# Patient Record
Sex: FEMALE | Race: WHITE | HISPANIC OR LATINO | Employment: PART TIME | URBAN - METROPOLITAN AREA
[De-identification: names, ages, dates, MRNs, and addresses within clinical notes are randomized per-mention and may not be internally consistent; named-entity substitution may affect disease eponyms.]

---

## 2025-03-28 ENCOUNTER — TELEPHONE (OUTPATIENT)
Dept: PRIMARY CARE | Facility: CLINIC | Age: 30
End: 2025-03-28

## 2025-03-28 ENCOUNTER — OFFICE VISIT (OUTPATIENT)
Dept: PRIMARY CARE | Facility: CLINIC | Age: 30
End: 2025-03-28
Payer: COMMERCIAL

## 2025-03-28 VITALS
DIASTOLIC BLOOD PRESSURE: 60 MMHG | OXYGEN SATURATION: 98 % | HEIGHT: 61 IN | BODY MASS INDEX: 21.52 KG/M2 | HEART RATE: 78 BPM | SYSTOLIC BLOOD PRESSURE: 106 MMHG | WEIGHT: 114 LBS

## 2025-03-28 DIAGNOSIS — N64.4 BREAST PAIN, LEFT: Primary | ICD-10-CM

## 2025-03-28 PROBLEM — L02.419 ABSCESS OF ARM: Status: RESOLVED | Noted: 2023-03-21 | Resolved: 2025-03-28

## 2025-03-28 PROBLEM — F41.1 GENERALIZED ANXIETY DISORDER: Status: RESOLVED | Noted: 2021-05-31 | Resolved: 2025-03-28

## 2025-03-28 PROBLEM — R05.9 COUGH: Status: RESOLVED | Noted: 2018-12-17 | Resolved: 2025-03-28

## 2025-03-28 PROBLEM — D72.829 ELEVATED WHITE BLOOD CELL COUNT: Status: RESOLVED | Noted: 2018-12-21 | Resolved: 2025-03-28

## 2025-03-28 PROBLEM — R42 DIZZINESS: Status: RESOLVED | Noted: 2018-12-06 | Resolved: 2025-03-28

## 2025-03-28 PROBLEM — R42 VERTIGO: Status: RESOLVED | Noted: 2025-03-28 | Resolved: 2025-03-28

## 2025-03-28 PROBLEM — H65.199 ACUTE NONSUPPURATIVE OTITIS MEDIA: Status: RESOLVED | Noted: 2020-02-19 | Resolved: 2025-03-28

## 2025-03-28 PROBLEM — J45.909 INTRINSIC ASTHMA: Status: ACTIVE | Noted: 2025-03-28

## 2025-03-28 PROBLEM — G43.109 MIGRAINE WITH AURA AND WITHOUT STATUS MIGRAINOSUS, NOT INTRACTABLE: Status: RESOLVED | Noted: 2025-03-28 | Resolved: 2025-03-28

## 2025-03-28 PROBLEM — J06.9 ACUTE UPPER RESPIRATORY INFECTION: Status: RESOLVED | Noted: 2021-10-13 | Resolved: 2025-03-28

## 2025-03-28 PROBLEM — F41.9 ANXIETY: Status: ACTIVE | Noted: 2018-12-06

## 2025-03-28 PROBLEM — F32.A DEPRESSION: Status: ACTIVE | Noted: 2025-03-28

## 2025-03-28 PROBLEM — J03.90 ACUTE TONSILLITIS: Status: RESOLVED | Noted: 2020-02-19 | Resolved: 2025-03-28

## 2025-03-28 PROBLEM — J04.0 LARYNGITIS: Status: RESOLVED | Noted: 2018-12-18 | Resolved: 2025-03-28

## 2025-03-28 PROBLEM — M79.10 MYALGIA: Status: RESOLVED | Noted: 2020-01-02 | Resolved: 2025-03-28

## 2025-03-28 PROBLEM — R21 RASH OF HANDS: Status: RESOLVED | Noted: 2019-03-24 | Resolved: 2025-03-28

## 2025-03-28 PROBLEM — H92.01 OTALGIA OF RIGHT EAR: Status: RESOLVED | Noted: 2021-05-10 | Resolved: 2025-03-28

## 2025-03-28 PROBLEM — M94.0 COSTOCHONDRITIS: Status: RESOLVED | Noted: 2019-10-22 | Resolved: 2025-03-28

## 2025-03-28 PROBLEM — L73.1 INGROWN HAIR: Status: RESOLVED | Noted: 2019-08-29 | Resolved: 2025-03-28

## 2025-03-28 PROBLEM — R59.9 GLANDS SWOLLEN: Status: RESOLVED | Noted: 2019-03-10 | Resolved: 2025-03-28

## 2025-03-28 PROCEDURE — 4004F PT TOBACCO SCREEN RCVD TLK: CPT

## 2025-03-28 PROCEDURE — 99203 OFFICE O/P NEW LOW 30 MIN: CPT

## 2025-03-28 PROCEDURE — 3008F BODY MASS INDEX DOCD: CPT

## 2025-03-28 ASSESSMENT — COLUMBIA-SUICIDE SEVERITY RATING SCALE - C-SSRS: 1. IN THE PAST MONTH, HAVE YOU WISHED YOU WERE DEAD OR WISHED YOU COULD GO TO SLEEP AND NOT WAKE UP?: NO

## 2025-03-28 ASSESSMENT — LIFESTYLE VARIABLES
AUDIT-C TOTAL SCORE: 0
HOW OFTEN DO YOU HAVE A DRINK CONTAINING ALCOHOL: NEVER
HOW OFTEN DO YOU HAVE SIX OR MORE DRINKS ON ONE OCCASION: NEVER
HOW MANY STANDARD DRINKS CONTAINING ALCOHOL DO YOU HAVE ON A TYPICAL DAY: PATIENT DOES NOT DRINK
SKIP TO QUESTIONS 9-10: 1

## 2025-03-28 ASSESSMENT — PATIENT HEALTH QUESTIONNAIRE - PHQ9
2. FEELING DOWN, DEPRESSED OR HOPELESS: NOT AT ALL
SUM OF ALL RESPONSES TO PHQ9 QUESTIONS 1 AND 2: 0
1. LITTLE INTEREST OR PLEASURE IN DOING THINGS: NOT AT ALL

## 2025-03-28 ASSESSMENT — ENCOUNTER SYMPTOMS
WEAKNESS: 0
NECK STIFFNESS: 1
CHILLS: 0
NECK PAIN: 0
MYALGIAS: 1
NUMBNESS: 0
EYES NEGATIVE: 1
WOUND: 0
FEVER: 0
SHORTNESS OF BREATH: 0

## 2025-03-28 ASSESSMENT — PAIN SCALES - GENERAL: PAINLEVEL_OUTOF10: 2

## 2025-03-28 NOTE — PROGRESS NOTES
"Subjective   Patient ID: Iveth Reddy is a 30 y.o. female who presents for Breast Pain (Patient is in office today for left side/breast pain for one week . Patient states she doesn't feels a lump but is very sore and tender with some pain . She said her whole left side feels tender . When she raises her left arm up there is pain . ).    Iveth is a 31 yo female presenting for left breast pain for one week. It was the most intense 2-3 days ago. The pain was along entire left side, muscle under left arm and side of neck. Feels like muscles are tense. Patient states the entire arm felt tingly yesterday. No lumps/rashes in breast. No recent fevers or sickness. 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 Job includes physical activity.       Patient Care Team:  Malika Lamb PA-C as PCP - General (Family Medicine)  Malika Lamb PA-C    PMH, PSH, family history and social history were reviewed and updated.    Review of Systems   Constitutional:  Negative for chills and fever.   HENT: Negative.     Eyes: Negative.    Respiratory:  Negative for shortness of breath.    Cardiovascular:  Negative for chest pain.   Musculoskeletal:  Positive for myalgias and neck stiffness. Negative for neck pain.   Skin:  Negative for rash and wound.   Neurological:  Negative for weakness and numbness.       Objective   /60   Pulse 78   Ht 1.549 m (5' 1\")   Wt 51.7 kg (114 lb)   LMP 03/14/2025   SpO2 98%   BMI 21.54 kg/m²     Physical Exam  Vitals and nursing note reviewed.   Constitutional:       General: She is not in acute distress.     Appearance: Normal appearance. She is not ill-appearing.   HENT:      Head: Normocephalic and atraumatic.   Cardiovascular:      Rate and Rhythm: Normal rate and regular rhythm.      Heart sounds: Normal heart sounds.   Pulmonary:      Effort: Pulmonary effort is normal. No respiratory " distress.      Breath sounds: Normal breath sounds. No stridor. No wheezing, rhonchi or rales.   Chest:      Chest wall: No tenderness.   Musculoskeletal:         General: Tenderness (Tenderness along most muscles under left arm. along chest on lateral side. Tenderness along tricep. Tenderness along left trapezius muscle.) present. No swelling, deformity or signs of injury. Normal range of motion.   Skin:     General: Skin is warm and dry.      Findings: No bruising, erythema, lesion or rash.   Neurological:      Mental Status: She is alert and oriented to person, place, and time.   Psychiatric:         Mood and Affect: Mood normal.         Behavior: Behavior normal.         Assessment/Plan   Assessment & Plan  Breast pain, left    Orders:    BI US breast limited left; Future    Most likely muscle pain, advised rest, ice/heat, tylenol, ibuprofen.  Breast US ordered in case patient is still worried lumps/breast cancer -- my suspicion is very low at this time.     Will most likely improve with time.   If no improvement please RTO and get US done     Follow up for CPE as soon as able, sooner with any problems or concerns.

## 2025-03-31 ENCOUNTER — TELEPHONE (OUTPATIENT)
Dept: PRIMARY CARE | Facility: CLINIC | Age: 30
End: 2025-03-31
Payer: COMMERCIAL

## 2025-04-08 PROCEDURE — 88175 CYTOPATH C/V AUTO FLUID REDO: CPT

## 2025-04-09 ENCOUNTER — LAB REQUISITION (OUTPATIENT)
Dept: LAB | Facility: HOSPITAL | Age: 30
End: 2025-04-09
Payer: COMMERCIAL

## 2025-04-09 DIAGNOSIS — Z11.51 ENCOUNTER FOR SCREENING FOR HUMAN PAPILLOMAVIRUS (HPV): ICD-10-CM

## 2025-04-09 DIAGNOSIS — Z01.419 ENCOUNTER FOR GYNECOLOGICAL EXAMINATION (GENERAL) (ROUTINE) WITHOUT ABNORMAL FINDINGS: ICD-10-CM

## 2025-04-09 DIAGNOSIS — Z12.4 ENCOUNTER FOR SCREENING FOR MALIGNANT NEOPLASM OF CERVIX: ICD-10-CM

## 2025-04-11 ENCOUNTER — APPOINTMENT (OUTPATIENT)
Dept: PRIMARY CARE | Facility: CLINIC | Age: 30
End: 2025-04-11
Payer: COMMERCIAL

## 2025-04-21 LAB
CYTOLOGY CMNT CVX/VAG CYTO-IMP: NORMAL
LAB AP HPV GENOTYPE QUESTION: YES
LAB AP HPV HR: NORMAL
LABORATORY COMMENT REPORT: NORMAL
LMP START DATE: NORMAL
PATH REPORT.TOTAL CANCER: NORMAL